# Patient Record
Sex: FEMALE | Race: WHITE
[De-identification: names, ages, dates, MRNs, and addresses within clinical notes are randomized per-mention and may not be internally consistent; named-entity substitution may affect disease eponyms.]

---

## 2018-06-19 NOTE — EDPHY
H & P


Stated Complaint: fall on stairs saturday impacted l flank area


Time Seen by Provider: 06/19/18 14:54


HPI/ROS: 





CHIEF COMPLAINT:  Left flank pain





HISTORY OF PRESENT ILLNESS:  This is a 43-year-old male who fell down 3 steps 2 

days ago.  While tumbling down she struck her left side and has had persistent 

left flank pain since that time.  No overlying bruising.  She denies rib pain, 

chest pain, pleuritic pain or difficulty breathing.  She has not noted blood in 

her urine.  However she is concerned that she could have a left kidney injury.  

She did not strike her head.  She has no midline back pain and denies numbness 

or weakness.  She also struck her left elbow but denies any serious injury to 

the arm.





REVIEW OF SYSTEMS:





A ten point review of systems was performed and is negative with the exception 

of the items mentioned in the HPI.





Past medical history:


1. PTSD


2. Migraine headaches


3. Cyclic vomiting syndrome


4. Reactive airway disease.








Social history:  She is new to this area, having lived in [facility].  

She has not yet decided whether she is going to settle in [place] or [place] and 

has not established care with a primary physician.





General Appearance:  Alert.  Vital signs reviewed.  


Head:  Normocephalic atraumatic.


HEENT:  JACQUELINE.  EOMI. 


Neck:  No lymphadenopathy.


Respiratory:  Lungs are clear to auscultation; no wheezes, rales, or rhonchi.


Cardiovascular:  Regular rate and rhythm; no murmur, rub, or gallop.


Gastrointestinal:  Abdomen is soft and nontender, no masses or organomegaly, 

bowel sounds normal.


Skin:  Warm and dry, no rashes on exposed skin, normal color.


Back:  Nontender to palpation over the thoracolumbar spine.  Left CVAT.  No 

flank bruising or swelling.


Extremities:  No lower extremity edema, no calf tenderness or swelling.  Full 

active range of motion of her left upper extremity at the shoulder and elbow.


Neurological:  Alert and oriented.  Moving all four extremities easily and 

equally.


Gait is normal.


Psychiatric:  Normal affect.





- Personal History


LMP (Females 10-55): 1-7 Days Ago


Current Tetanus/Diphtheria Vaccine: Yes





- Medical/Surgical History


Hx Asthma: No


Hx Chronic Respiratory Disease: No


Hx Diabetes: No


Hx Cardiac Disease: No


Hx Renal Disease: No


Hx Cirrhosis: No


Hx Alcoholism: No


Hx HIV/AIDS: No


Hx Splenectomy or Spleen Trauma: No


Other PMH: cyclical vomiting/ptsd/migraines





- Social History


Smoking Status: Former smoker


Constitutional: 


 Initial Vital Signs











Temperature (C)  36.8 C   06/19/18 12:47


 


Heart Rate  65   06/19/18 12:47


 


Respiratory Rate  16   06/19/18 12:47


 


Blood Pressure  99/56 L  06/19/18 12:47


 


O2 Sat (%)  93   06/19/18 12:47








 











O2 Delivery Mode               Room Air














Allergies/Adverse Reactions: 


 





Benzodiazepines Allergy (Verified 06/19/18 12:46)


 


propranolol Allergy (Verified 06/19/18 12:46)


 


opiates Allergy (Uncoded 06/19/18 12:46)


 








Home Medications: 














 Medication  Instructions  Recorded


 


Compazine 10mg (*)  06/19/18


 


Relpax  06/19/18














Medical Decision Making


Procedures: 





Procedure:  Assess kidneys after fall





Limited bedside ultrasound was performed and interpreted by myself for the 

indication of:  thoracoabdominal trauma





Limited abdominal ultrasound for blunt abdominal trauma.


1) The right upper quadrant was visualized and was found to be negative for 

intraperitoneal fluid.


2) The left upper quadrant was visualized and found to be negative for 

intraperitoneal fluid.


The study was felt to be negative for free intraperitoneal fluid.





The study was felt to be negative for free intraperitoneal fluid.


ED Course/Re-evaluation: 





Patient is concerned about left kidney injury after falling 2 days ago.  She 

has had persistent left flank pain.  An abbreviated fast exam was performed by 

me.  Both kidneys were visualized and appear normal, no signs of fluid or other 

kidney abnormality.





Patient tells me that she is might be pregnant, is not using birth control.  

When asked if she wanted a pregnancy test she stated yes.  A urine pregnancy 

test is negative.  She has recently completed a menstrual cycle.





Urinalysis is negative for blood.





I feel that she can safely return home.  I have not found evidence of kidney 

injury.  I am recommending Tylenol for pain control.


Differential Diagnosis: 





Considered a differential diagnosis that includes but is not limited to 

retroperitoneal injury, intra-abdominal injury, rib injury/pneumothorax, 

ureterolithiasis, urinary tract infection, and musculoskeletal thoracic/back 

injury.





- Data Points


Point of Care Test Results: 


 Urine Pregnancy











Collection Date                06/19/18


 


Collection Time                15:32


 


HCG Results                    Negative

















Departure





- Departure


Disposition: Home, Routine, Self-Care


Clinical Impression: 


 Left flank pain





Condition: Good


Instructions:  Flank Pain (ED)


Additional Instructions: 


I have not found evidence of injury to your kidney.  I recommend that you take 

Tylenol for pain control.  You can take 650 mg every 4 hr as needed for pain 

control.  You can also take ibuprofen if you would like.  Remember though that 

ibuprofen can affect kidney function.  As stated, I have not found injury to 

your kidney and I think that it is likely safe for you to use this medication.





Adult Pain & Fever Control:


We recommend Acetaminophen (Tylenol) and Ibuprofen (Motrin,Advil) for pain and 

fever control.  When fever is high or pain severe, both drugs can be used at 

the same time, but at different intervals.  Please note the time differences.  


Your dose is:     Acetaminophen 650mg every 4 to 6 hours


        Ibuprofen 400mg every 8 hours with food   OR


        


Note:  do not take Acetaminophen with Hydrocodone (Vicodin, Lortab) or Oycodone 

(Percocet).  These medications also contain Acetaminophen.  No more than 3000mg 

of Acetaminophen should be taken in 24 hours (for an adult).





If you find that you need a primary care physician in this area I am referring 

you to Dr. Efrain Dumont.


Referrals: 


Efrain Dumont MD [Medical Doctor] - As per Instructions

## 2018-06-21 NOTE — EDPHY
H & P


Time Seen by Provider: 06/21/18 16:21


HPI/ROS: 





Chief complaint.  Flank pain





HPI.  43-year-old female with fall 5 days ago.  She slipped and fell on the 

floor striking her left flank.  She was concerned about injury to kidney.  She 

was seen in the emergency department 2 days ago where she had normal bedside 

ultrasound, negative pregnancy test, urinalysis negative for blood.  She 

continues to have flank pain but now has left anterior abdominal pain.  She did 

not prior have pain in the front of her abdomen.  She has nausea but no 

vomiting or diarrhea.  Somewhat dizzy.  No urinary symptoms.  No chest 

discomfort or pain or shortness of breath.





ROS


Constitutional.  no fever/chills, no weakness


Eyes.  no problems with vision


ENT.  no sore throat, no nasal drainage


Cardiovascular.  no chest pain


Respiratory.  no shortness of breath, no cough no fever


Abdominal.  Left flank and left abdominal pain with nausea


.  no problems urinating


MS.  no calf pain/swelling, no neck/back pain, no joint pain


Skin.  no rash


Lymph.  no swollen glands


Neuro.  no headache, no dizziness, no difficulty walking or with speech


Past Medical/Surgical History: 





PTSD, migraines, cyclic vomiting syndrome, reactive airway disease


Social History: 





Single, nonsmoker, no alcohol


Smoking Status: Former smoker


Physical Exam: 





General Appearance:  Alert well-developed female mild distress vital signs are 

stable


Eyes: Pupils equal and round no pallor or injection.


ENT, Mouth:  Mucous membranes are moist.


Respiratory:  There are no retractions, lungs are clear to auscultation.


Cardiovascular: Regular rate and rhythm.


Gastrointestinal:  Abdomen has  tenderness in the left mid abdomen.  Also left 

flank.  No obvious surface trauma


Neurological:  Awake and alert, sensory and motor exams grossly normal.


Skin: Warm and dry, no rashes.


Musculoskeletal:  Neck is supple nontender.


Extremities  symmetrical, full range of motion.


Psychiatric: Patient is oriented X 3, there is no agitation.


Constitutional: 


 Initial Vital Signs











Temperature (C)  36.8 C   06/21/18 16:11


 


Heart Rate  74   06/21/18 16:11


 


Respiratory Rate  16   06/21/18 16:11


 


Blood Pressure  106/63   06/21/18 16:11


 


O2 Sat (%)  95   06/21/18 16:11








 











O2 Delivery Mode               Room Air














Allergies/Adverse Reactions: 


 





Benzodiazepines Allergy (Verified 06/19/18 12:46)


 


propranolol Allergy (Verified 06/19/18 12:46)


 


opiates Allergy (Uncoded 06/19/18 12:46)


 








Home Medications: 














 Medication  Instructions  Recorded


 


Compazine 10mg (*)  06/19/18


 


Relpax  06/19/18














Medical Decision Making





- Diagnostics


Imaging Results: 


 Imaging Impressions





Abdomen CT  06/21/18 16:40


Impression:


1. No evidence of organ injury within the abdomen or pelvis.


2. Nondisplaced fracture involving the rudimentary rib left L1 segment. No 

additional osseous abnormality seen. 


3. Incidental follicular cyst left adnexa.


 


Findings discussed with Candelario Velazco M.D.  at  18:11 hour, 6/21/2018. 








CT reviewed by me and discussed with Dr. Riley shows a nondisplaced fracture a 

rudimentary rib left L1.  Incidental finding of follicular cyst left adnexa.  

However spleen and left kidney appear normal


Procedures: 





IV normal saline


ED Course/Re-evaluation: 





Re-evaluation 6:20 p.m..  Patient is stable.  She and I discussed imaging and 

lab results.  We discussed treatment plan including criteria for return 

importance of follow-up and further evaluation.  She expresses understanding 

and agreement


Differential Diagnosis: 





I considered intra-abdominal injury including spleen and kidney injury.  I 

considered spine as well as intestinal injury.





- Data Points


Laboratory Results: 


 Laboratory Results





 06/21/18 17:00 





 06/21/18 17:00 





 











  06/21/18 06/21/18





  17:00 17:00


 


WBC    12.93 10^3/uL H 10^3/uL





    (3.80-9.50) 


 


RBC    4.61 10^6/uL 10^6/uL





    (4.18-5.33) 


 


Hgb    14.1 g/dL g/dL





    (12.6-16.3) 


 


Hct    41.5 % %





    (38.0-47.0) 


 


MCV    90.0 fL fL





    (81.5-99.8) 


 


MCH    30.6 pg pg





    (27.9-34.1) 


 


MCHC    34.0 g/dL g/dL





    (32.4-36.7) 


 


RDW    12.8 % %





    (11.5-15.2) 


 


Plt Count    220 10^3/uL 10^3/uL





    (150-400) 


 


MPV    11.3 fL fL





    (8.7-11.7) 


 


Neut % (Auto)    53.8 % %





    (39.3-74.2) 


 


Lymph % (Auto)    36.2 % %





    (15.0-45.0) 


 


Mono % (Auto)    5.2 % %





    (4.5-13.0) 


 


Eos % (Auto)    3.8 % %





    (0.6-7.6) 


 


Baso % (Auto)    0.5 % %





    (0.3-1.7) 


 


Nucleat RBC Rel Count    0.0 % %





    (0.0-0.2) 


 


Absolute Neuts (auto)    6.96 10^3/uL H 10^3/uL





    (1.70-6.50) 


 


Absolute Lymphs (auto)    4.68 10^3/uL H 10^3/uL





    (1.00-3.00) 


 


Absolute Monos (auto)    0.67 10^3/uL 10^3/uL





    (0.30-0.80) 


 


Absolute Eos (auto)    0.49 10^3/uL H 10^3/uL





    (0.03-0.40) 


 


Absolute Basos (auto)    0.06 10^3/uL 10^3/uL





    (0.02-0.10) 


 


Absolute Nucleated RBC    0.00 10^3/uL 10^3/uL





    (0-0.01) 


 


Immature Gran %    0.5 % %





    (0.0-1.1) 


 


Immature Gran #    0.06 10^3/uL 10^3/uL





    (0.00-0.10) 


 


RBC/WBC/PLT Morphology    TNP 





   


 


Platelet Estimate    TNP 





   


 


Sodium  140 mEq/L mEq/L  





   (135-145)  


 


Potassium  4.7 mEq/L mEq/L  





   (3.3-5.0)  


 


Chloride  101 mEq/L mEq/L  





   ()  


 


Carbon Dioxide  25 mEq/l mEq/l  





   (22-31)  


 


Anion Gap  14 mEq/L mEq/L  





   (8-16)  


 


BUN  17 mg/dL mg/dL  





   (7-23)  


 


Creatinine  0.9 mg/dL mg/dL  





   (0.6-1.0)  


 


Estimated GFR  > 60   





   


 


Glucose  89 mg/dL mg/dL  





   ()  


 


Calcium  9.4 mg/dL mg/dL  





   (8.5-10.4)  











Medications Given: 


 








Discontinued Medications





Sodium Chloride (Ns)  1,000 mls @ 0 mls/hr IV ONCE ONE; Wide Open


   PRN Reason: Protocol


   Stop: 06/21/18 16:41


   Last Admin: 06/21/18 17:01 Dose:  1,000 mls








Departure





- Departure


Disposition: Home, Routine, Self-Care


Clinical Impression: 


Rib fracture


Qualifiers:


 Encounter type: initial encounter Rib fracture type: single rib Fracture type: 

closed Laterality: left Qualified Code(s): S22.32XA - Fracture of one rib, left 

side, initial encounter for closed fracture





Ovarian cyst


Qualifiers:


 Laterality: left Qualified Code(s): N83.202 - Unspecified ovarian cyst, left 

side





Condition: Good


Instructions:  Ovarian Cyst (ED), Rib Fracture (ED)


Additional Instructions: 


Heat to sore areas of left-sided abdomen and left back.





Ibuprofen 4-600 mg every 6 hr for discomfort.  Tylenol 650 mg every 4-6 hours 

for discomfort.





Activity as tolerated.





Re-evaluation if not improving in 3-4 days


Referrals: 


NONE *PRIMARY CARE P,. [Primary Care Provider] - As per Instructions

## 2018-10-25 ENCOUNTER — HOSPITAL ENCOUNTER (EMERGENCY)
Dept: HOSPITAL 80 - FED | Age: 43
Discharge: HOME | End: 2018-10-25
Payer: MEDICAID

## 2018-10-25 VITALS — DIASTOLIC BLOOD PRESSURE: 52 MMHG | SYSTOLIC BLOOD PRESSURE: 102 MMHG

## 2018-10-25 DIAGNOSIS — R51: Primary | ICD-10-CM

## 2018-10-25 DIAGNOSIS — E86.9: ICD-10-CM

## 2018-10-25 LAB — PLATELET # BLD: 200 10^3/UL (ref 150–400)

## 2018-10-25 NOTE — EDPHY
H & P


Stated Complaint: ha/nausea/hx migraines rx relpax not working


Time Seen by Provider: 10/25/18 09:54


HPI/ROS: 





CHIEF COMPLAINT:  Headache, nausea, vomiting





HISTORY OF PRESENT ILLNESS:  The patient presents the ED with a 2 day history 

of headache, nausea and vomiting.  The patient does have a history of migraine 

headaches but states that this headache does feel somewhat different.  She 

reportedly is currently having a very stressful domestic situation and had to 

contact the police for restraining order against 1 of her roommates yesterday.  

The patient has been taking Relpax at home without improvement of her symptoms.

  The patient denies any acute numbness or weakness.  She denies any history of 

fall or trauma.  She denies any additional infectious symptoms.





REVIEW OF SYSTEMS:


A comprehensive 10 point review of systems is otherwise negative aside from 

elements mentioned in the history of present illness.


Source: Patient


Exam Limitations: No limitations





- Personal History


LMP (Females 10-55): 8-14 Days Ago


Current Tetanus Diphtheria and Acellular Pertussis (TDAP): Yes





- Medical/Surgical History


Hx Asthma: No


Hx Chronic Respiratory Disease: No


Hx Diabetes: No


Hx Cardiac Disease: No


Hx Renal Disease: No


Hx Cirrhosis: No


Hx Alcoholism: No


Hx HIV/AIDS: No


Hx Splenectomy or Spleen Trauma: No


Other PMH: cyclical vomiting/ptsd/migraines





- Social History


Smoking Status: Former smoker





- Physical Exam


Exam: 





General Appearance:  Alert, no distress


Head:  Atraumatic


Eyes:  Pupils equal, round, reactive


ENT, Mouth:  No hemotympanum, no oral trauma


Neck:  Nontender, trachea midline


Respiratory:  No chest wall tender, subcutaneous air, lungs clear bilaterally


Cardiovascular:  Regular rate and rhythm


Abdomen:  Abdomen is soft and nontender, pelvis stable


Skin:  No lacerations, No abrasion


Back:  No midline T/L/S pain


Extremities:  Nontender, full range of motion


Neurological:  A&Ox3, normal motor function, normal sensory exam


Constitutional: 


 Initial Vital Signs











Temperature (C)  36.8 C   10/25/18 09:50


 


Heart Rate  67   10/25/18 09:50


 


Respiratory Rate  18   10/25/18 09:50


 


Blood Pressure  117/77   10/25/18 09:50


 


O2 Sat (%)  96   10/25/18 09:50








 











O2 Delivery Mode               Room Air














Allergies/Adverse Reactions: 


 





Benzodiazepines Allergy (Verified 10/25/18 09:50)


 


propranolol Allergy (Verified 10/25/18 09:50)


 


opiates Allergy (Uncoded 06/19/18 12:46)


 








Home Medications: 














 Medication  Instructions  Recorded


 


Compazine 10mg (*)  06/19/18


 


Relpax  06/19/18














Medical Decision Making





- Diagnostics


Imaging Results: 


 Imaging Impressions





Head CT  10/25/18 10:36


Impression: No acute intracranial process.


 


Findings and recommendations discussed with Cristobal Peterson  at [facility], 10/25/

2018.











ED Course/Re-evaluation: 





The patient arrives and is neurologically intact.  She has no evidence of 

meningeal symptoms.  Her blood pressure and heart rate are normal.





The patient had an IV established.  She was treated with Reglan, Toradol and 

Benadryl.





I re-evaluated the patient at 10:00 a.m..  She reported minimal improvement of 

her symptoms.  She stated her headache is somewhat atypical in the sense that 

it feels more occipital at this point time.





Given that this is not her typical migraine she was sent for a CT scan of the 

head which demonstrated no evidence of an acute intracranial process.





The patient was treated additionally with Decadron.





I re-evaluated the patient at 12:15 p.m..  She reports her symptoms have 

entirely resolved.  She denies any acute numbness or weakness.  She denies 

additional acute complaints and would like to be discharged home. 


Differential Diagnosis: 





Differential diagnosis considered includes tension headache, migraine headache, 

occipital neuralgia, intracranial hemorrhage, CNS tumor





- Data Points


Laboratory Results: 


 Laboratory Results





 10/25/18 10:02 





 10/25/18 10:02 





 











  10/25/18 10/25/18 10/25/18





  10:02 10:02 10:02


 


WBC      9.88 10^3/uL H 10^3/uL





     (3.80-9.50) 


 


RBC      4.75 10^6/uL 10^6/uL





     (4.18-5.33) 


 


Hgb      14.3 g/dL g/dL





     (12.6-16.3) 


 


Hct      42.5 % %





     (38.0-47.0) 


 


MCV      89.5 fL fL





     (81.5-99.8) 


 


MCH      30.1 pg pg





     (27.9-34.1) 


 


MCHC      33.6 g/dL g/dL





     (32.4-36.7) 


 


RDW      12.9 % %





     (11.5-15.2) 


 


Plt Count      200 10^3/uL 10^3/uL





     (150-400) 


 


MPV      11.6 fL fL





     (8.7-11.7) 


 


Neut % (Auto)      59.0 % %





     (39.3-74.2) 


 


Lymph % (Auto)      31.4 % %





     (15.0-45.0) 


 


Mono % (Auto)      5.2 % %





     (4.5-13.0) 


 


Eos % (Auto)      3.7 % %





     (0.6-7.6) 


 


Baso % (Auto)      0.3 % %





     (0.3-1.7) 


 


Nucleat RBC Rel Count      0.0 % %





     (0.0-0.2) 


 


Absolute Neuts (auto)      5.83 10^3/uL 10^3/uL





     (1.70-6.50) 


 


Absolute Lymphs (auto)      3.10 10^3/uL H 10^3/uL





     (1.00-3.00) 


 


Absolute Monos (auto)      0.51 10^3/uL 10^3/uL





     (0.30-0.80) 


 


Absolute Eos (auto)      0.37 10^3/uL 10^3/uL





     (0.03-0.40) 


 


Absolute Basos (auto)      0.03 10^3/uL 10^3/uL





     (0.02-0.10) 


 


Absolute Nucleated RBC      0.00 10^3/uL 10^3/uL





     (0-0.01) 


 


Immature Gran %      0.4 % %





     (0.0-1.1) 


 


Immature Gran #      0.04 10^3/uL 10^3/uL





     (0.00-0.10) 


 


Sodium    139 mEq/L mEq/L  





    (135-145)  


 


Potassium    4.1 mEq/L mEq/L  





    (3.3-5.0)  


 


Chloride    106 mEq/L mEq/L  





    ()  


 


Carbon Dioxide    22 mEq/l mEq/l  





    (22-31)  


 


Anion Gap    11 mEq/L mEq/L  





    (6-14)  


 


BUN    17 mg/dL mg/dL  





    (7-23)  


 


Creatinine    1.0 mg/dL mg/dL  





    (0.6-1.0)  


 


Estimated GFR    > 60   





    


 


Glucose    96 mg/dL mg/dL  





    ()  


 


Calcium    9.4 mg/dL mg/dL  





    (8.5-10.4)  


 


Beta HCG, Qual  NEGATIVE     





    











Medications Given: 


 








Discontinued Medications





Dexamethasone (Decadron Injection)  10 mg IVP EDNOW ONE


   Stop: 10/25/18 10:37


   Last Admin: 10/25/18 10:43 Dose:  10 mg


Diphenhydramine HCl (Benadryl Injection)  50 mg IVP EDNOW ONE


   Stop: 10/25/18 10:00


   Last Admin: 10/25/18 10:08 Dose:  50 mg


Sodium Chloride (Ns)  1,000 mls @ 0 mls/hr IV ONCE ONE; Wide Open


   PRN Reason: Protocol


   Stop: 10/25/18 10:00


   Last Admin: 10/25/18 10:04 Dose:  1,000 mls


Ketorolac Tromethamine (Toradol)  30 mg IVP EDNOW ONE


   Stop: 10/25/18 10:00


   Last Admin: 10/25/18 10:08 Dose:  30 mg


Metoclopramide HCl (Reglan Injection)  10 mg IVP EDNOW ONE


   Stop: 10/25/18 10:00


   Last Admin: 10/25/18 10:08 Dose:  10 mg








Departure





- Departure


Disposition: Home, Routine, Self-Care


Clinical Impression: 


 Acute headache





Condition: Good


Instructions:  Acute Headache (ED)


Additional Instructions: 


1. Please return to the ED immediately for any recurrent severe headache, 

numbness, weakness, fever, neck stiffness or other concerns.


2. You have been given the contact number of our on-call neurologist if you 

would like further evaluation as an outpatient.








Referrals: 


Eduin Lawrence DO [Medical Doctor] - As per Instructions

## 2019-02-04 ENCOUNTER — HOSPITAL ENCOUNTER (EMERGENCY)
Dept: HOSPITAL 80 - FED | Age: 44
Discharge: HOME | End: 2019-02-04
Payer: MEDICAID

## 2019-02-04 VITALS — SYSTOLIC BLOOD PRESSURE: 103 MMHG | DIASTOLIC BLOOD PRESSURE: 68 MMHG

## 2019-02-04 DIAGNOSIS — E86.9: ICD-10-CM

## 2019-02-04 DIAGNOSIS — J10.1: Primary | ICD-10-CM

## 2019-02-04 LAB — PLATELET # BLD: 141 10^3/UL (ref 150–400)

## 2019-02-04 NOTE — EDPHY
General


Time Seen by Provider: 02/04/19 11:47


Narrative: 





CLINICAL IMPRESSION: Influenza a 


_________________


ASSESSMENT/PLAN:


43-year-old female presents to the emergency department with 3 days of body 

aches, subjective high fevers, fatigue, malaise, nausea and vomiting.  Vital 

signs stable on arrival, she appears dehydrated and generally ill appearing.  

She received a L of fluids, laboratory evaluation and urine studies all of 

which were reassuring aside from a positive influenza A diagnosis.  No hypoxia, 

respiratory distress or clinical suggestion of underlying pulmonary disease.  

She admitted to feeling much better after Tylenol and was able to tolerate 

water.  She has Compazine at home to use for nausea.  I explained that she is 

out of the window to try Tamiflu.  She will plan to rest at home, work note 

provided, follow up with PCP, warning signs return to ED sooner outlined in 

discharge.


_________________


DIFFERENTIAL DX:


 Differential diagnosis includes but not limited to viral URI, influenza, 

influenza like syndrome, bacterial URI, acute surgical abdomen, dehydration, UTI

, pyelonephritis, electrolyte imbalance


_________________


ED PROCEDURES:


 See lab and/or imaging results below


_________________


CHIEF COMPLAINT:  Body aches, high fevers


_________________


HPI:


A 43-year-old female presents to the emergency department with 3 days of 

generalized body aches, subjective high fevers, "organ pain", and fatigue.  She 

also feels she may have a UTI as she has had some increased frequency.  No 

flank pain.  She has been nauseous and vomiting.  No diarrhea.  She did not get 

a flu shot this year.  No underlying cardiopulmonary disease.  No reported 

severe cough or shortness of breath.  She was diagnosed with Ebstein Valerio virus 

several months ago 


_________________


PAST MEDICAL HISTORY: 


 Cyclic vomiting syndrome See triage summary and nurse notes for addition 

applicable history 





Pertinent Past Surgical History:  None reported





Family History:  Noncontributory





Social History:  Nonsmoker, does not drink alcohol, smokes marijuana


_________________


REVIEW OF SYSTEMS:


A full 10 point review of systems was negative except for those mentioned in 

HPI. 


_________________


PHYSICAL EXAM:


General Appearance:  Alert, oriented, appropriate, cooperative, appears 

uncomfortable well hydrated, non-toxic appearing, VSS, no hypoxia.


HEENT: TMs are clear bilaterally no perforation or FB, no injection, no 

evidence of serous or mucopurulent otitis. Oropharynx dry mucous membranes no 

tonsillar hypertrophy or asymmetry.  Dentition without abnormality.


Eyes: PERRLA, no acute vision change, nystagmus, swelling, discharge, pain or 

photosensitivity. Conjunctiva pink, no pallor or injection


Neck: Supple, nontender, no lymphadenopathy, no midline pain, FROM, no 

meningismus.


Respiratory:  There are no retractions, lungs are clear to auscultation.


Cardiac:  Regular rate and rhythm, no murmurs or gallops.


Gastrointestinal: Abdomen is soft, nontender, bowel sounds normal, no masses/

hernia, no rigidity, guarding or focal peritoneal findings.


Skin: Warm, dry, no rashes, no nodules on palpation.


_________________


MEDICAL DECISION MAKING:


Patient was seen independently. Secondary supervising physician at time of 

evaluation was: Dr. Calix .


Diagnosis: Influenza a . New, requires workup


Summary: See Assessment and Plan for summary of ED visit 


Clinical lab tests:  ordered / reviewed.


Independent visualization of images, tracing, or specimens:  Yes.








Patient Progress:  Improved. 





- History


Smoking Status: Former smoker





- Objective


Vital Signs: 


 Initial Vital Signs











Temperature (C)  37 C   02/04/19 11:38


 


Heart Rate  80   02/04/19 11:38


 


Respiratory Rate  17   02/04/19 11:38


 


Blood Pressure  113/76   02/04/19 11:38


 


O2 Sat (%)  94   02/04/19 11:38








 











O2 Delivery Mode               Room Air














Allergies/Adverse Reactions: 


 





Benzodiazepines Allergy (Verified 02/04/19 11:37)


 


propranolol Allergy (Verified 02/04/19 11:37)


 


opiates Allergy (Uncoded 06/19/18 12:46)


 








Home Medications: 














 Medication  Instructions  Recorded


 


Compazine 10mg (*)  06/19/18


 


Relpax  06/19/18











Laboratory Results: 


 Laboratory Results





 02/04/19 12:45 





 02/04/19 12:45 








Medications Given: 


 








Discontinued Medications





Acetaminophen (Tylenol)  1,000 mg PO EDNOW ONE


   Stop: 02/04/19 12:25


   Last Admin: 02/04/19 12:43 Dose:  1,000 mg


Sodium Chloride (Ns)  1,000 mls @ 0 mls/hr IV EDNOW ONE; Wide Open


   PRN Reason: Protocol


   Stop: 02/04/19 12:22


   Last Admin: 02/04/19 12:44 Dose:  1,000 mls


Ondansetron HCl (Zofran)  4 mg IVP EDNOW ONE


   Stop: 02/04/19 12:22


   Last Admin: 02/04/19 12:43 Dose:  4 mg








Departure





- Departure


Disposition: Home, Routine, Self-Care


Clinical Impression: 


 Influenza A





Condition: Good


Instructions:  Influenza (ED)


Additional Instructions: 


DISCHARGE INSTRUCTIONS FROM YOUR DOCTOR 


Thank you for visiting our emergency department today. Please keep in mind that 

discharge from the emergency department does not mean that there is nothing 

wrong - it simply means that we have not identified an emergency condition that 

requires further evaluation or treatment in the hospital. You should always 

plan to follow up with primary care for re-evaluation of your condition in the 

next 2-3 days. If you have been referred to a specialist, please call as soon 

as possible (today or tomorrow) to schedule your follow up appointment at the 

appropriate time. 





 LAB EVALUATION IN THE EMERGENCY DEPARTMENT REVEALED THAT YOU HAVE INFLUENZA A. 

THE REMAINDER OF HER LAB AND URINE STUDIES ARE NORMAL.  PLEASE STAY WELL 

HYDRATED, USE TYLENOL AND IBUPROFEN FOR FEVER BODY ACHES, FOLLOW UP WITH A 

PRIMARY CARE PROVIDER.  RETURN TO THE EMERGENCY DEPARTMENT FOR WORSENING 

SYMPTOMS, HIGH FEVERS, SHORTNESS OF BREATH, SEVERE COUGH, OR ANY OTHER CONCERNS.





People present with illnesses and injuries in different ways, and it is always 

possible that we have missed something. You may always return for re-evaluation 

if symptoms worsen or if they are not improving or if you develop new/different 

symptoms. 


Again, thank you for choosing our emergency department. We hope that you feel 

better..


Referrals: 


NONE *PRIMARY CARE P,. [Primary Care Provider] - As per Instructions


[facility],. [Clinic] - 2-3 days, call for appt.


Stand Alone Forms:  Work Excuse